# Patient Record
(demographics unavailable — no encounter records)

---

## 2017-08-08 NOTE — NUR
PATIENT POSITIONED PRONE ALL AREAS CHECKED FOR ANY IMPINGEMENTS
INCLUDING GENITALS, HANGING FREE AND NO IMPINGEMENTS, ALL AREAS
PRESSURE POINTS PADDED AND SECURED, CAROL.

## 2017-08-08 NOTE — OP
PATIENT NAME:  RAMIN DE LEON                            MEDICAL RECORD: [id number]
:41                                             LOCATION:D.OPS          
                                                         ADMISSION DATE:        
SURGEON:  RAFAEL LIN MD                 
 
 
DATE OF OPERATION:  2017
 
PREOPERATIVE DIAGNOSES:  Disc herniation L2-L3 right with lumbar radiculopathy
at L3, L4 and L5.
 
POSTOPERATIVE DIAGNOSES:  Disc herniation L2-L3 right with lumbar radiculopathy
at L3, L4 and L5.
 
SURGEON:  Rafael Lin MD.
 
PLACE OF SERVICE:  [facility].
 
PROCEDURES:  Lumbar laminotomy, medial facetectomy and foraminotomy with
discectomy, L2-L3 right, with microscopic illumination.
 
DESCRIPTION OF TECHNIQUE:  After induction of general endotracheal anesthesia,
the patient was rolled prone on chest and hip rolls.  The L2-L3 interspace was
localized with a spinal needle and fluoroscopy.  A stab incision was created
with #11 blade.  A series of dilators was used to advance a METRx retractor into
the L2-L3 interspace on the right side.  Level was confirmed with fluoroscopic
x-ray.  A microscope and Midas Aleksander drill were used to perform a laminotomy,
medial facetectomy and foraminotomy at L2-L3 on the right.  The ligamentum
flavum was made with Cloward rongeurs.  Following this, there is an obvious
ventral compression at the dura bed and eccentric mass.  This was removed with
pituitary rongeurs.  Additional material was removed from the disc space. 
Following this, the dura was decompressed well.  Additional fragments were swept
from the spinal canal with a ball-tip nerve hook.  Meticulous hemostasis was
maintained throughout the wound.  The wound was irrigated with copious amounts
of Ancef irrigant solution.  The fascia was closed with 2-0 Vicryl suture, the
subdermal layer was closed with 3-0 Vicryl suture.  The skin was closed with
staples.  A sterile dressing was applied to the wound.  The patient was awakened
in good condition and taken to recovery.  All counts were reported as correct. 
Estimated blood loss was minimal.
 
TRANSINT:[id number] Voice Confirmation ID: [id number] DOCUMENT ID: [id number]
                                           
                                           RAFAEL LIN MD                 
 
 
 
 
 
 
 
CC:                                                             [id number]
DICTATION DATE: 17 0758     :     17 1049      [facility] 
                                                                      [id number] [place] [address]